# Patient Record
Sex: MALE | Race: WHITE | NOT HISPANIC OR LATINO | Employment: FULL TIME | URBAN - METROPOLITAN AREA
[De-identification: names, ages, dates, MRNs, and addresses within clinical notes are randomized per-mention and may not be internally consistent; named-entity substitution may affect disease eponyms.]

---

## 2017-05-11 ENCOUNTER — HOSPITAL ENCOUNTER (OUTPATIENT)
Facility: MEDICAL CENTER | Age: 37
End: 2017-05-11
Attending: SURGERY
Payer: COMMERCIAL

## 2017-05-11 PROCEDURE — 88305 TISSUE EXAM BY PATHOLOGIST: CPT

## 2019-02-28 ENCOUNTER — APPOINTMENT (OUTPATIENT)
Dept: RADIOLOGY | Facility: MEDICAL CENTER | Age: 39
End: 2019-02-28
Attending: EMERGENCY MEDICINE
Payer: COMMERCIAL

## 2019-02-28 ENCOUNTER — HOSPITAL ENCOUNTER (EMERGENCY)
Facility: MEDICAL CENTER | Age: 39
End: 2019-03-01
Attending: EMERGENCY MEDICINE
Payer: COMMERCIAL
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
## 2019-02-28 PROCEDURE — 99284 EMERGENCY DEPT VISIT MOD MDM: CPT

## 2019-02-28 PROCEDURE — 86706 HEP B SURFACE ANTIBODY: CPT

## 2019-02-28 PROCEDURE — 86803 HEPATITIS C AB TEST: CPT

## 2019-02-28 PROCEDURE — 87389 HIV-1 AG W/HIV-1&-2 AB AG IA: CPT

## 2019-02-28 PROCEDURE — 87340 HEPATITIS B SURFACE AG IA: CPT

## 2019-02-28 PROCEDURE — 36415 COLL VENOUS BLD VENIPUNCTURE: CPT

## 2019-02-28 PROCEDURE — 86704 HEP B CORE ANTIBODY TOTAL: CPT

## 2019-02-28 RX ORDER — OMEPRAZOLE 20 MG/1
20 CAPSULE, DELAYED RELEASE ORAL DAILY
COMMUNITY

## 2019-03-01 VITALS
DIASTOLIC BLOOD PRESSURE: 98 MMHG | SYSTOLIC BLOOD PRESSURE: 152 MMHG | RESPIRATION RATE: 13 BRPM | BODY MASS INDEX: 30.04 KG/M2 | WEIGHT: 202.82 LBS | TEMPERATURE: 97.9 F | OXYGEN SATURATION: 92 % | HEART RATE: 118 BPM | HEIGHT: 69 IN

## 2019-03-01 LAB
HBV CORE AB SERPL QL IA: NEGATIVE
HBV SURFACE AB SERPL IA-ACNC: <3.1 MIU/ML (ref 0–10)
HBV SURFACE AG SER QL: NEGATIVE
HCV AB SER QL: NEGATIVE
HIV 1+2 AB+HIV1 P24 AG SERPL QL IA: NON REACTIVE

## 2019-03-01 PROCEDURE — 70450 CT HEAD/BRAIN W/O DYE: CPT

## 2019-03-01 PROCEDURE — 70486 CT MAXILLOFACIAL W/O DYE: CPT
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
## 2019-03-01 NOTE — ED NOTES
All lines and monitors DC'd.  Discharge instructions given, questions answered.  Ambulated out of ER, escorted by Phi laureano.  Pt states all belongings in possession.  Instructed not to drive after pain medication and pt verbalizes understanding.  RX x0 given.